# Patient Record
Sex: FEMALE | Race: OTHER | HISPANIC OR LATINO | URBAN - METROPOLITAN AREA
[De-identification: names, ages, dates, MRNs, and addresses within clinical notes are randomized per-mention and may not be internally consistent; named-entity substitution may affect disease eponyms.]

---

## 2020-06-01 ENCOUNTER — OFFICE VISIT (OUTPATIENT)
Dept: URBAN - METROPOLITAN AREA CLINIC 63 | Facility: CLINIC | Age: 50
End: 2020-06-01

## 2022-07-09 ENCOUNTER — TELEPHONE ENCOUNTER (OUTPATIENT)
Dept: URBAN - METROPOLITAN AREA CLINIC 121 | Facility: CLINIC | Age: 52
End: 2022-07-09

## 2022-07-09 RX ORDER — PANTOPRAZOLE 20 MG/1
TAKE 1 TABLET BY MOUTH EVERY DAY TABLET, DELAYED RELEASE ORAL
Refills: 0 | OUTPATIENT
Start: 2021-04-23 | End: 2021-05-02

## 2022-07-09 RX ORDER — FENOFIBRATE 145 MG/1
TABLET, FILM COATED ORAL
Refills: 3 | OUTPATIENT
Start: 2018-12-21 | End: 2019-08-14

## 2022-07-09 RX ORDER — METOCLOPRAMIDE HYDROCHLORIDE 5 MG/1
TABLET ORAL
Refills: 0 | OUTPATIENT
Start: 2019-08-14 | End: 2020-04-20

## 2022-07-09 RX ORDER — PANTOPRAZOLE SODIUM 40 MG/1
TABLET, DELAYED RELEASE ORAL
Refills: 3 | OUTPATIENT
Start: 2018-12-06 | End: 2019-08-14

## 2022-07-09 RX ORDER — PRUCALOPRIDE 2 MG/1
ONCE A DAY TABLET, FILM COATED ORAL ONCE A DAY
Refills: 3 | OUTPATIENT
Start: 2020-02-12 | End: 2020-05-21

## 2022-07-09 RX ORDER — IRBESARTAN 150 MG/1
TABLET ORAL
Refills: 0 | OUTPATIENT
Start: 2019-01-17 | End: 2019-08-14

## 2022-07-09 RX ORDER — GINKGO BILOBA LEAF EXTRACT 120 MG
CAPSULE ORAL
Refills: 0 | OUTPATIENT
Start: 2014-12-11 | End: 2015-04-01

## 2022-07-09 RX ORDER — PANTOPRAZOLE 20 MG/1
TABLET, DELAYED RELEASE ORAL TWICE A DAY
Refills: 3 | OUTPATIENT
Start: 2014-10-24 | End: 2014-11-19

## 2022-07-09 RX ORDER — METOPROLOL SUCCINATE 25 MG/1
TABLET, EXTENDED RELEASE ORAL
Refills: 0 | OUTPATIENT
Start: 2012-09-19 | End: 2014-09-08

## 2022-07-09 RX ORDER — TIZANIDINE HYDROCHLORIDE 4 MG/1
TABLET ORAL
Refills: 1 | OUTPATIENT
Start: 2018-12-21 | End: 2019-08-14

## 2022-07-09 RX ORDER — PREGABALIN 100 MG
CAPSULE ORAL TAKE AS DIRECTED
Refills: 0 | OUTPATIENT
Start: 2015-04-01 | End: 2019-01-17

## 2022-07-09 RX ORDER — PANTOPRAZOLE 20 MG/1
ONCE A DAY TABLET, DELAYED RELEASE ORAL ONCE A DAY
Refills: 3 | OUTPATIENT
Start: 2020-05-21 | End: 2021-04-23

## 2022-07-09 RX ORDER — METOPROLOL SUCCINATE 25 MG/1
TABLET, EXTENDED RELEASE ORAL
Refills: 0 | OUTPATIENT
Start: 2014-09-08 | End: 2019-01-17

## 2022-07-09 RX ORDER — FAMOTIDINE 20 MG
ONCE A DAY TABLET ORAL ONCE A DAY
Refills: 2 | OUTPATIENT
Start: 2015-04-01 | End: 2016-03-03

## 2022-07-09 RX ORDER — GABAPENTIN 300 MG/1
CAPSULE ORAL
Refills: 3 | OUTPATIENT
Start: 2018-12-21 | End: 2019-08-14

## 2022-07-09 RX ORDER — BUTALBITAL, ACETAMINOPHEN, AND CAFFEINE 50; 325; 40 MG/1; MG/1; MG/1
TABLET ORAL
Refills: 0 | OUTPATIENT
Start: 2018-12-21 | End: 2019-08-14

## 2022-07-09 RX ORDER — NAPROXEN 500 MG/1
TABLET ORAL
Refills: 0 | OUTPATIENT
Start: 2012-09-19 | End: 2012-09-19

## 2022-07-09 RX ORDER — IBUPROFEN 800 MG/1
TABLET, FILM COATED ORAL
Refills: 1 | OUTPATIENT
Start: 2018-12-21 | End: 2019-08-14

## 2022-07-09 RX ORDER — NAPROXEN 500 MG/1
TABLET ORAL
Refills: 0 | OUTPATIENT
Start: 2012-09-19 | End: 2013-03-04

## 2022-07-09 RX ORDER — METOPROLOL SUCCINATE 100 MG/1
TABLET, EXTENDED RELEASE ORAL
Refills: 3 | OUTPATIENT
Start: 2018-12-21 | End: 2019-08-14

## 2022-07-09 RX ORDER — FAMOTIDINE 20 MG
TABLET ORAL
Refills: 0 | OUTPATIENT
Start: 2014-09-08 | End: 2015-04-01

## 2022-07-09 RX ORDER — ONDANSETRON HYDROCHLORIDE 4 MG/1
TABLET, FILM COATED ORAL
Refills: 0 | OUTPATIENT
Start: 2019-01-17 | End: 2019-08-14

## 2022-07-09 RX ORDER — TIZANIDINE HYDROCHLORIDE 2 MG/1
CAPSULE ORAL
Refills: 0 | OUTPATIENT
Start: 2012-09-19 | End: 2013-03-04

## 2022-07-10 ENCOUNTER — TELEPHONE ENCOUNTER (OUTPATIENT)
Dept: URBAN - METROPOLITAN AREA CLINIC 121 | Facility: CLINIC | Age: 52
End: 2022-07-10

## 2022-07-10 RX ORDER — PRUCALOPRIDE 2 MG/1
ONCE A DAY TABLET, FILM COATED ORAL ONCE A DAY
Refills: 3 | Status: ACTIVE | COMMUNITY
Start: 2020-05-21

## 2022-07-10 RX ORDER — TIZANIDINE HYDROCHLORIDE 4 MG/1
TABLET ORAL
Refills: 1 | Status: ACTIVE | COMMUNITY
Start: 2019-08-14

## 2022-07-10 RX ORDER — PANTOPRAZOLE SODIUM 40 MG/1
TABLET, DELAYED RELEASE ORAL
Refills: 3 | Status: ACTIVE | COMMUNITY
Start: 2019-08-14

## 2022-07-10 RX ORDER — HYDROCORTISONE 2.5% 25 MG/G
CREAM TOPICAL THREE TIMES A DAY
Refills: 0 | Status: ACTIVE | COMMUNITY
Start: 2019-01-17

## 2022-07-10 RX ORDER — FAMOTIDINE 20 MG
ONCE A DAY TABLET ORAL ONCE A DAY
Refills: 2 | Status: ACTIVE | COMMUNITY
Start: 2016-03-03

## 2022-07-10 RX ORDER — DOCUSATE SODIUM 100 MG/1
CAPSULE ORAL TWICE A DAY
Refills: 3 | Status: ACTIVE | COMMUNITY
Start: 2012-09-19

## 2022-07-10 RX ORDER — PANTOPRAZOLE 20 MG/1
ONCE A DAY TABLET, DELAYED RELEASE ORAL ONCE A DAY
Refills: 1 | Status: ACTIVE | COMMUNITY
Start: 2021-05-02

## 2022-07-10 RX ORDER — SIMVASTATIN 20 MG/1
TABLET, FILM COATED ORAL ONCE A DAY
Refills: 3 | Status: ACTIVE | COMMUNITY
Start: 2014-12-11

## 2022-07-10 RX ORDER — ONDANSETRON HYDROCHLORIDE 4 MG/1
TABLET, FILM COATED ORAL
Refills: 0 | Status: ACTIVE | COMMUNITY
Start: 2019-08-14

## 2022-07-10 RX ORDER — IRBESARTAN 150 MG/1
TABLET ORAL
Refills: 0 | Status: ACTIVE | COMMUNITY
Start: 2019-08-14

## 2022-07-10 RX ORDER — LORATADINE 5 MG
17 G IN 8 OZ WATER PO Q DAILY PRN TABLET,CHEWABLE ORAL TAKE AS DIRECTED
Refills: 4 | Status: ACTIVE | COMMUNITY
Start: 2012-09-19

## 2022-07-10 RX ORDER — PANTOPRAZOLE 20 MG/1
TABLET, DELAYED RELEASE ORAL TWICE A DAY
Refills: 3 | Status: ACTIVE | COMMUNITY
Start: 2014-11-19

## 2022-07-10 RX ORDER — GABAPENTIN 300 MG/1
CAPSULE ORAL
Refills: 3 | Status: ACTIVE | COMMUNITY
Start: 2019-08-14

## 2022-07-10 RX ORDER — METOPROLOL SUCCINATE 100 MG/1
TABLET, EXTENDED RELEASE ORAL
Refills: 3 | Status: ACTIVE | COMMUNITY
Start: 2019-08-14

## 2022-07-10 RX ORDER — BUTALBITAL, ACETAMINOPHEN, AND CAFFEINE 50; 325; 40 MG/1; MG/1; MG/1
TABLET ORAL
Refills: 0 | Status: ACTIVE | COMMUNITY
Start: 2019-08-14

## 2022-07-10 RX ORDER — SACCHAROMYCES BOULARDII 250 MG
CAPSULE ORAL ONCE A DAY
Refills: 1 | Status: ACTIVE | COMMUNITY
Start: 2014-11-19

## 2022-07-10 RX ORDER — IBUPROFEN 800 MG/1
TABLET, FILM COATED ORAL
Refills: 1 | Status: ACTIVE | COMMUNITY
Start: 2019-08-14

## 2023-08-18 ENCOUNTER — TELEPHONE ENCOUNTER (OUTPATIENT)
Dept: URBAN - METROPOLITAN AREA CLINIC 64 | Facility: CLINIC | Age: 53
End: 2023-08-18

## 2023-08-21 ENCOUNTER — WEB ENCOUNTER (OUTPATIENT)
Dept: URBAN - METROPOLITAN AREA CLINIC 63 | Facility: CLINIC | Age: 53
End: 2023-08-21

## 2023-08-21 ENCOUNTER — TELEPHONE ENCOUNTER (OUTPATIENT)
Dept: URBAN - METROPOLITAN AREA CLINIC 63 | Facility: CLINIC | Age: 53
End: 2023-08-21

## 2023-08-21 ENCOUNTER — LAB OUTSIDE AN ENCOUNTER (OUTPATIENT)
Dept: URBAN - METROPOLITAN AREA CLINIC 63 | Facility: CLINIC | Age: 53
End: 2023-08-21

## 2023-08-21 ENCOUNTER — OFFICE VISIT (OUTPATIENT)
Dept: URBAN - METROPOLITAN AREA CLINIC 63 | Facility: CLINIC | Age: 53
End: 2023-08-21
Payer: MEDICARE

## 2023-08-21 VITALS
TEMPERATURE: 97.8 F | DIASTOLIC BLOOD PRESSURE: 98 MMHG | BODY MASS INDEX: 31.28 KG/M2 | HEART RATE: 118 BPM | HEIGHT: 62 IN | WEIGHT: 170 LBS | OXYGEN SATURATION: 97 % | SYSTOLIC BLOOD PRESSURE: 158 MMHG

## 2023-08-21 DIAGNOSIS — K76.0 FATTY (CHANGE OF) LIVER, NOT ELSEWHERE CLASSIFIED: ICD-10-CM

## 2023-08-21 DIAGNOSIS — R74.8 ABNORMAL LIVER ENZYMES: ICD-10-CM

## 2023-08-21 DIAGNOSIS — K31.84 GASTROPARESIS: ICD-10-CM

## 2023-08-21 DIAGNOSIS — K29.50 CHRONIC GASTRITIS WITHOUT BLEEDING, UNSPECIFIED GASTRITIS TYPE: ICD-10-CM

## 2023-08-21 DIAGNOSIS — K21.9 GASTROESOPHAGEAL REFLUX DISEASE WITHOUT ESOPHAGITIS: ICD-10-CM

## 2023-08-21 PROBLEM — 266435005: Status: ACTIVE | Noted: 2023-08-21

## 2023-08-21 PROBLEM — 8493009: Status: ACTIVE | Noted: 2023-08-21

## 2023-08-21 PROCEDURE — 76700 US EXAM ABDOM COMPLETE: CPT | Performed by: NURSE PRACTITIONER

## 2023-08-21 PROCEDURE — 76705 ECHO EXAM OF ABDOMEN: CPT | Performed by: NURSE PRACTITIONER

## 2023-08-21 PROCEDURE — 99203 OFFICE O/P NEW LOW 30 MIN: CPT | Performed by: NURSE PRACTITIONER

## 2023-08-21 PROCEDURE — 91200 LIVER ELASTOGRAPHY: CPT | Performed by: NURSE PRACTITIONER

## 2023-08-21 RX ORDER — SACCHAROMYCES BOULARDII 250 MG
CAPSULE ORAL ONCE A DAY
Refills: 1 | Status: ACTIVE | COMMUNITY
Start: 2014-11-19

## 2023-08-21 RX ORDER — PANTOPRAZOLE SODIUM 40 MG/1
1 TABLET TABLET, DELAYED RELEASE ORAL ONCE A DAY
Qty: 90 TABLET | Refills: 0 | OUTPATIENT
Start: 2023-08-21

## 2023-08-21 RX ORDER — METOPROLOL SUCCINATE 100 MG/1
TABLET, EXTENDED RELEASE ORAL
Refills: 3 | Status: ACTIVE | COMMUNITY
Start: 2019-08-14

## 2023-08-21 RX ORDER — METOCLOPRAMIDE 5 MG/1
1 TABLET BEFORE MEALS TABLET ORAL TWICE A DAY
Qty: 180 TABLET | OUTPATIENT
Start: 2023-08-21

## 2023-08-21 RX ORDER — IRBESARTAN 150 MG/1
TABLET ORAL
Refills: 0 | Status: ACTIVE | COMMUNITY
Start: 2019-08-14

## 2023-08-21 RX ORDER — PANTOPRAZOLE SODIUM 40 MG/1
TABLET, DELAYED RELEASE ORAL
Refills: 3 | Status: ACTIVE | COMMUNITY
Start: 2019-08-14

## 2023-08-21 RX ORDER — GABAPENTIN 300 MG/1
CAPSULE ORAL
Refills: 3 | Status: ACTIVE | COMMUNITY
Start: 2019-08-14

## 2023-08-21 RX ORDER — SIMVASTATIN 20 MG/1
TABLET, FILM COATED ORAL ONCE A DAY
Refills: 3 | Status: ACTIVE | COMMUNITY
Start: 2014-12-11

## 2023-08-21 RX ORDER — ONDANSETRON HYDROCHLORIDE 4 MG/1
TABLET, FILM COATED ORAL
Refills: 0 | Status: ACTIVE | COMMUNITY
Start: 2019-08-14

## 2023-08-21 NOTE — HPI-TODAY'S VISIT:
12/14: Patient still having high levels of  liver enzymes and also high level of cholesterol and triglycerides, but they are trending down. Had decreased in her weight 7 pounds (3.18 kg) in last 3 weeks. Abdomen US fatty liver. Patient states she is feeling better but is not taking her Reglan nor the Florastor, She agrees continue with her treatment, will fu her in 3 moths.  4/15:  Patient continue with weight loss 2 lbs since last visit, AST 74 increase from 65, ALT: is 94 from 100, normal bili 0.4 and AP is normal 69.   TG: from 156 to 112. And cholesterol is 198.  Will continue Zocor for now, will follow in 3 months. Will do labs and ultrasound.  1/19;  Patient comes with severe GERD ad lower abdominal pain. Patient comes for surveillance colonoscopy and irritation of hemorrhoids and episode of black Stool melena, does had episode of coffee ground  reflux 2 weeks ago. Will start Reglan for her gastroparesis, and will continue PPI, EGD and colonoscopy. Patient takes NSAID will avoid NSAID.  8/19: Patient had a colonoscopy on February 1, 2019, with the finding of a 5 mm polyp in the sigmoid colon that was removed with cold biopsy forceps, internal hemorrhoids otherwise normal colon. Pathology showed hyperplastic poly, will repeat colonoscopy in 5 years. EGD showed small hiatal hernia and gastritis negative for H Pylori. Distal esophagus with inflammation, negative for Intestinal metaplasia or for fungal microorganism. Patient persist with symptoms pf gastroparesis, resolved  with Reglan will restart treatment patient denies any side effect and will start Reglan. Patient LFT persist elevated but had decrease AST: 47 and ALT: 83 rest of the labs Bili, and AP is negative. Will follow in 6 months. Discussed the need for high fiber diet and adequate hydration.  Information sheet reviewed a copy provided.   2/20 Patient here today complaining of having  left lower quadrant abdominal pain. The pain is not accompanied by nausea, vomiting, diarrhea, or fever, she is also complaining of constipation, patient has done a colonoscopy a year ago that shows evidence of internal hemorrhoids and one small benign polyp with no evidence of diverticular disease. Plan: Patient will do clear liquid diet today, will call us if symptoms get worse or do not improve, or will go to ER Department.  Patient also will have CT abdomen and pelvis. Will start on laxatives, will increase fiber and fluid intake. Continue metoclopramide 5 mg 3 times a day.  No side effects reported. 4/20 Patient is complaining of having symptoms of gastritis and also gastroparesis with abdominal distention increasing reflux, increasing gases abdominal upper abdominal pain decrease in appetite.  Patient said she is taking metoclopramide just 1 time a day in a.m.  She is not doing diet properly, she is eating large portion meal late at night. Metoclopramide side effect were discussed with her again. Plan: Patient will do diet and treatment for gastritis and gastroparesis.  She will continue Reglan 5 mg 3 times a day and also will continue with PPI once a day.  Patient will call us if symptoms do not improve. 5/20 Patient seems to be in good general state today, she did increase metoclopramide 5 mg 3 times a day and symptoms of gastroparesis are better, Patient is complaining of having some twitching on her right eyelids.  She said she never experiences this kind of muscle movement before she started metoclopramide.Patient also complaining of constipation which is chronic patient has tried OTC laxative like lactulose, bisacodyl, milk of magnesia, MiraLAX did not work for him for her she said Motegrity is working good for her. Plan: Patient will continue with Motegrity 2 mg daily, and will alternate every other day if diarrhea.  She also will increase fiber and fluid intake and exercises. Patient will do diet for gastroparesis increasing frequency of her meals and decreasing the load of her meals. Patient was instructed to DC the use of metoclopramide due to involuntary muscle movement. Patient will do diet for gastritis and reflux and will have pantoprazole 20 mg once a day. 8/23 Patient here today in good general state.  She is complaining of symptom of dyspepsia, gastritis, reflux, and for a CT abdomen and pelvis done on May 2023 that shows evidence of fatty liver, she also claims to have elevated liver enzymes.  Unfortunately I do not have this laboratory report. Patient will have EGD, gastric emptying study, and liver work-up.  Diet for gastroparesis, continue with metoclopramide 5 mg 3 times a day.

## 2023-08-22 ENCOUNTER — OFFICE VISIT (OUTPATIENT)
Dept: URBAN - METROPOLITAN AREA CLINIC 60 | Facility: CLINIC | Age: 53
End: 2023-08-22

## 2023-08-28 ENCOUNTER — LAB OUTSIDE AN ENCOUNTER (OUTPATIENT)
Dept: URBAN - METROPOLITAN AREA CLINIC 63 | Facility: CLINIC | Age: 53
End: 2023-08-28

## 2023-09-14 ENCOUNTER — TELEPHONE ENCOUNTER (OUTPATIENT)
Dept: URBAN - METROPOLITAN AREA CLINIC 63 | Facility: CLINIC | Age: 53
End: 2023-09-14

## 2023-09-21 ENCOUNTER — LAB OUTSIDE AN ENCOUNTER (OUTPATIENT)
Dept: URBAN - METROPOLITAN AREA CLINIC 63 | Facility: CLINIC | Age: 53
End: 2023-09-21

## 2023-09-25 ENCOUNTER — OFFICE VISIT (OUTPATIENT)
Dept: URBAN - METROPOLITAN AREA CLINIC 63 | Facility: CLINIC | Age: 53
End: 2023-09-25

## 2023-09-29 ENCOUNTER — TELEPHONE ENCOUNTER (OUTPATIENT)
Dept: URBAN - METROPOLITAN AREA CLINIC 63 | Facility: CLINIC | Age: 53
End: 2023-09-29

## 2023-10-05 ENCOUNTER — OFFICE VISIT (OUTPATIENT)
Dept: URBAN - METROPOLITAN AREA SURGERY CENTER 4 | Facility: SURGERY CENTER | Age: 53
End: 2023-10-05

## 2023-10-13 ENCOUNTER — TELEPHONE ENCOUNTER (OUTPATIENT)
Dept: URBAN - METROPOLITAN AREA CLINIC 63 | Facility: CLINIC | Age: 53
End: 2023-10-13

## 2023-10-13 RX ORDER — PANTOPRAZOLE SODIUM 40 MG/1
1 TABLET TABLET, DELAYED RELEASE ORAL ONCE A DAY
Qty: 90 TABLET | Refills: 0
Start: 2023-08-21

## 2023-10-20 ENCOUNTER — TELEPHONE ENCOUNTER (OUTPATIENT)
Dept: URBAN - METROPOLITAN AREA CLINIC 63 | Facility: CLINIC | Age: 53
End: 2023-10-20

## 2023-10-20 RX ORDER — PANTOPRAZOLE SODIUM 40 MG/1
1 TABLET TABLET, DELAYED RELEASE ORAL ONCE A DAY
Qty: 90 TABLET | Refills: 0
Start: 2023-08-21

## 2023-10-24 ENCOUNTER — OFFICE VISIT (OUTPATIENT)
Dept: URBAN - METROPOLITAN AREA CLINIC 60 | Facility: CLINIC | Age: 53
End: 2023-10-24

## 2023-11-10 ENCOUNTER — CLAIMS CREATED FROM THE CLAIM WINDOW (OUTPATIENT)
Dept: URBAN - METROPOLITAN AREA SURGERY CENTER 4 | Facility: SURGERY CENTER | Age: 53
End: 2023-11-10
Payer: MEDICARE

## 2023-11-10 DIAGNOSIS — Z12.11 ENCOUNTER FOR SCREENING FOR MALIGNANT NEOPLASM OF COLON: ICD-10-CM

## 2023-11-10 DIAGNOSIS — Z87.19 PERSONAL HISTORY OF OTHER DISEASES OF THE DIGESTIVE SYSTEM: ICD-10-CM

## 2023-11-10 DIAGNOSIS — K29.50 CHRONIC GASTRITIS WITHOUT BLEEDING, UNSPECIFIED GASTRITIS TYPE: ICD-10-CM

## 2023-11-10 DIAGNOSIS — Z86.010 ADENOMAS PERSONAL HISTORY OF COLONIC POLYPS: ICD-10-CM

## 2023-11-10 DIAGNOSIS — K63.5 BENIGN COLON POLYPS: ICD-10-CM

## 2023-11-10 DIAGNOSIS — K44.9 DIAPHRAGMATIC HERNIA WITHOUT OBSTRUCTION OR GANGRENE: ICD-10-CM

## 2023-11-10 DIAGNOSIS — K64.1 SECOND DEGREE HEMORRHOIDS: ICD-10-CM

## 2023-11-10 DIAGNOSIS — K63.5 POLYP OF SIGMOID COLON, UNSPECIFIED TYPE: ICD-10-CM

## 2023-11-10 DIAGNOSIS — K44.9 HIATAL HERNIA: ICD-10-CM

## 2023-11-10 DIAGNOSIS — K21.9 ESOPHAGEAL REFLUX: ICD-10-CM

## 2023-11-10 PROCEDURE — 43239 EGD BIOPSY SINGLE/MULTIPLE: CPT | Performed by: INTERNAL MEDICINE

## 2023-11-10 PROCEDURE — 45380 COLONOSCOPY AND BIOPSY: CPT | Performed by: INTERNAL MEDICINE

## 2023-11-10 PROCEDURE — 00813 ANES UPR LWR GI NDSC PX: CPT | Performed by: NURSE ANESTHETIST, CERTIFIED REGISTERED

## 2023-11-10 RX ORDER — METOCLOPRAMIDE 5 MG/1
1 TABLET BEFORE MEALS TABLET ORAL TWICE A DAY
Qty: 180 TABLET | Status: ACTIVE | COMMUNITY
Start: 2023-08-21

## 2023-11-10 RX ORDER — GABAPENTIN 300 MG/1
CAPSULE ORAL
Refills: 3 | Status: ACTIVE | COMMUNITY
Start: 2019-08-14

## 2023-11-10 RX ORDER — METOPROLOL SUCCINATE 100 MG/1
TABLET, EXTENDED RELEASE ORAL
Refills: 3 | Status: ACTIVE | COMMUNITY
Start: 2019-08-14

## 2023-11-10 RX ORDER — PANTOPRAZOLE SODIUM 40 MG/1
TABLET, DELAYED RELEASE ORAL
Refills: 3 | Status: ACTIVE | COMMUNITY
Start: 2019-08-14

## 2023-11-10 RX ORDER — IRBESARTAN 150 MG/1
TABLET ORAL
Refills: 0 | Status: ACTIVE | COMMUNITY
Start: 2019-08-14

## 2023-11-10 RX ORDER — ONDANSETRON HYDROCHLORIDE 4 MG/1
TABLET, FILM COATED ORAL
Refills: 0 | Status: ACTIVE | COMMUNITY
Start: 2019-08-14

## 2023-11-10 RX ORDER — SACCHAROMYCES BOULARDII 250 MG
CAPSULE ORAL ONCE A DAY
Refills: 1 | Status: ACTIVE | COMMUNITY
Start: 2014-11-19

## 2023-11-10 RX ORDER — SIMVASTATIN 20 MG/1
TABLET, FILM COATED ORAL ONCE A DAY
Refills: 3 | Status: ACTIVE | COMMUNITY
Start: 2014-12-11

## 2023-11-10 RX ORDER — PANTOPRAZOLE SODIUM 40 MG/1
1 TABLET TABLET, DELAYED RELEASE ORAL ONCE A DAY
Qty: 90 TABLET | Refills: 0 | Status: ACTIVE | COMMUNITY
Start: 2023-08-21

## 2023-11-21 ENCOUNTER — OFFICE VISIT (OUTPATIENT)
Dept: URBAN - METROPOLITAN AREA CLINIC 60 | Facility: CLINIC | Age: 53
End: 2023-11-21

## 2023-11-21 RX ORDER — SIMVASTATIN 20 MG/1
TABLET, FILM COATED ORAL ONCE A DAY
Refills: 3 | Status: ACTIVE | COMMUNITY
Start: 2014-12-11

## 2023-11-21 RX ORDER — GABAPENTIN 300 MG/1
CAPSULE ORAL
Refills: 3 | Status: ACTIVE | COMMUNITY
Start: 2019-08-14

## 2023-11-21 RX ORDER — METOPROLOL SUCCINATE 100 MG/1
TABLET, EXTENDED RELEASE ORAL
Refills: 3 | Status: ACTIVE | COMMUNITY
Start: 2019-08-14

## 2023-11-21 RX ORDER — PANTOPRAZOLE SODIUM 40 MG/1
1 TABLET TABLET, DELAYED RELEASE ORAL ONCE A DAY
Qty: 90 TABLET | Refills: 0 | Status: ACTIVE | COMMUNITY
Start: 2023-08-21

## 2023-11-21 RX ORDER — IRBESARTAN 150 MG/1
TABLET ORAL
Refills: 0 | Status: ACTIVE | COMMUNITY
Start: 2019-08-14

## 2023-11-21 RX ORDER — METOCLOPRAMIDE 5 MG/1
1 TABLET BEFORE MEALS TABLET ORAL TWICE A DAY
Qty: 180 TABLET | Status: ACTIVE | COMMUNITY
Start: 2023-08-21

## 2023-11-21 RX ORDER — PANTOPRAZOLE SODIUM 40 MG/1
TABLET, DELAYED RELEASE ORAL
Refills: 3 | Status: ACTIVE | COMMUNITY
Start: 2019-08-14

## 2023-11-21 RX ORDER — ONDANSETRON HYDROCHLORIDE 4 MG/1
TABLET, FILM COATED ORAL
Refills: 0 | Status: ACTIVE | COMMUNITY
Start: 2019-08-14

## 2023-11-21 RX ORDER — SACCHAROMYCES BOULARDII 250 MG
CAPSULE ORAL ONCE A DAY
Refills: 1 | Status: ACTIVE | COMMUNITY
Start: 2014-11-19

## 2023-12-29 ENCOUNTER — OFFICE VISIT (OUTPATIENT)
Dept: URBAN - METROPOLITAN AREA CLINIC 60 | Facility: CLINIC | Age: 53
End: 2023-12-29
Payer: MEDICARE

## 2023-12-29 ENCOUNTER — DASHBOARD ENCOUNTERS (OUTPATIENT)
Age: 53
End: 2023-12-29

## 2023-12-29 VITALS
TEMPERATURE: 97.6 F | BODY MASS INDEX: 32.57 KG/M2 | HEART RATE: 87 BPM | SYSTOLIC BLOOD PRESSURE: 144 MMHG | WEIGHT: 177 LBS | DIASTOLIC BLOOD PRESSURE: 82 MMHG | RESPIRATION RATE: 20 BRPM | OXYGEN SATURATION: 98 % | HEIGHT: 62 IN

## 2023-12-29 DIAGNOSIS — K44.9 HIATAL HERNIA: ICD-10-CM

## 2023-12-29 DIAGNOSIS — K21.00 GASTROESOPHAGEAL REFLUX DISEASE WITH ESOPHAGITIS WITHOUT HEMORRHAGE: ICD-10-CM

## 2023-12-29 DIAGNOSIS — K29.50 OTHER CHRONIC GASTRITIS WITHOUT HEMORRHAGE: ICD-10-CM

## 2023-12-29 DIAGNOSIS — K31.84 GASTROPARESIS: ICD-10-CM

## 2023-12-29 PROBLEM — 8493009: Status: ACTIVE | Noted: 2023-12-29

## 2023-12-29 PROBLEM — 266433003: Status: ACTIVE | Noted: 2023-12-29

## 2023-12-29 PROCEDURE — 99214 OFFICE O/P EST MOD 30 MIN: CPT | Performed by: NURSE PRACTITIONER

## 2023-12-29 RX ORDER — IRBESARTAN 150 MG/1
TABLET ORAL
Refills: 0 | Status: ACTIVE | COMMUNITY
Start: 2019-08-14

## 2023-12-29 RX ORDER — PANTOPRAZOLE SODIUM 20 MG/1
1 TABLET TABLET, DELAYED RELEASE ORAL ONCE A DAY
Qty: 90 TABLET | Refills: 0 | OUTPATIENT
Start: 2023-12-29

## 2023-12-29 RX ORDER — ONDANSETRON HYDROCHLORIDE 4 MG/1
TABLET, FILM COATED ORAL
Refills: 0 | Status: ACTIVE | COMMUNITY
Start: 2019-08-14

## 2023-12-29 RX ORDER — METOCLOPRAMIDE 5 MG/1
1 TABLET BEFORE MEALS TABLET ORAL TWICE A DAY
Qty: 180 TABLET | Status: ACTIVE | COMMUNITY
Start: 2023-08-21

## 2023-12-29 RX ORDER — GABAPENTIN 300 MG/1
CAPSULE ORAL
Refills: 3 | Status: ACTIVE | COMMUNITY
Start: 2019-08-14

## 2023-12-29 RX ORDER — PANTOPRAZOLE SODIUM 40 MG/1
1 TABLET TABLET, DELAYED RELEASE ORAL ONCE A DAY
Qty: 90 TABLET | Refills: 0 | Status: ACTIVE | COMMUNITY
Start: 2023-08-21

## 2023-12-29 RX ORDER — PANTOPRAZOLE SODIUM 40 MG/1
TABLET, DELAYED RELEASE ORAL
Refills: 3 | Status: ACTIVE | COMMUNITY
Start: 2019-08-14

## 2023-12-29 RX ORDER — SIMVASTATIN 20 MG/1
TABLET, FILM COATED ORAL ONCE A DAY
Refills: 3 | Status: ACTIVE | COMMUNITY
Start: 2014-12-11

## 2023-12-29 RX ORDER — SACCHAROMYCES BOULARDII 250 MG
CAPSULE ORAL ONCE A DAY
Refills: 1 | Status: ACTIVE | COMMUNITY
Start: 2014-11-19

## 2023-12-29 RX ORDER — METOCLOPRAMIDE 5 MG/1
1 TABLET BEFORE MEALS TABLET ORAL TWICE A DAY
Qty: 180 TABLET | Refills: 1 | OUTPATIENT
Start: 2023-12-29

## 2023-12-29 RX ORDER — METOPROLOL SUCCINATE 100 MG/1
TABLET, EXTENDED RELEASE ORAL
Refills: 3 | Status: ACTIVE | COMMUNITY
Start: 2019-08-14

## 2023-12-29 NOTE — HPI-TODAY'S VISIT:
12/14: Patient still having high levels of  liver enzymes and also high level of cholesterol and triglycerides, but they are trending down. Had decreased in her weight 7 pounds (3.18 kg) in last 3 weeks. Abdomen US fatty liver. Patient states she is feeling better but is not taking her Reglan nor the Florastor, She agrees continue with her treatment, will fu her in 3 moths.  4/15:  Patient continue with weight loss 2 lbs since last visit, AST 74 increase from 65, ALT: is 94 from 100, normal bili 0.4 and AP is normal 69.   TG: from 156 to 112. And cholesterol is 198.  Will continue Zocor for now, will follow in 3 months. Will do labs and ultrasound.  1/19;  Patient comes with severe GERD ad lower abdominal pain. Patient comes for surveillance colonoscopy and irritation of hemorrhoids and episode of black Stool melena, does had episode of coffee ground  reflux 2 weeks ago. Will start Reglan for her gastroparesis, and will continue PPI, EGD and colonoscopy. Patient takes NSAID will avoid NSAID.  8/19: Patient had a colonoscopy on February 1, 2019, with the finding of a 5 mm polyp in the sigmoid colon that was removed with cold biopsy forceps, internal hemorrhoids otherwise normal colon. Pathology showed hyperplastic poly, will repeat colonoscopy in 5 years. EGD showed small hiatal hernia and gastritis negative for H Pylori. Distal esophagus with inflammation, negative for Intestinal metaplasia or for fungal microorganism. Patient persist with symptoms pf gastroparesis, resolved  with Reglan will restart treatment patient denies any side effect and will start Reglan. Patient LFT persist elevated but had decrease AST: 47 and ALT: 83 rest of the labs Bili, and AP is negative. Will follow in 6 months. Discussed the need for high fiber diet and adequate hydration.  Information sheet reviewed a copy provided.   2/20 Patient here today complaining of having  left lower quadrant abdominal pain. The pain is not accompanied by nausea, vomiting, diarrhea, or fever, she is also complaining of constipation, patient has done a colonoscopy a year ago that shows evidence of internal hemorrhoids and one small benign polyp with no evidence of diverticular disease. Plan: Patient will do clear liquid diet today, will call us if symptoms get worse or do not improve, or will go to ER Department.  Patient also will have CT abdomen and pelvis. Will start on laxatives, will increase fiber and fluid intake. Continue metoclopramide 5 mg 3 times a day.  No side effects reported. 4/20 Patient is complaining of having symptoms of gastritis and also gastroparesis with abdominal distention increasing reflux, increasing gases abdominal upper abdominal pain decrease in appetite.  Patient said she is taking metoclopramide just 1 time a day in a.m.  She is not doing diet properly, she is eating large portion meal late at night. Metoclopramide side effect were discussed with her again. Plan: Patient will do diet and treatment for gastritis and gastroparesis.  She will continue Reglan 5 mg 3 times a day and also will continue with PPI once a day.  Patient will call us if symptoms do not improve. 5/20 Patient seems to be in good general state today, she did increase metoclopramide 5 mg 3 times a day and symptoms of gastroparesis are better, Patient is complaining of having some twitching on her right eyelids.  She said she never experiences this kind of muscle movement before she started metoclopramide.Patient also complaining of constipation which is chronic patient has tried OTC laxative like lactulose, bisacodyl, milk of magnesia, MiraLAX did not work for him for her she said Motegrity is working good for her. Plan: Patient will continue with Motegrity 2 mg daily, and will alternate every other day if diarrhea.  She also will increase fiber and fluid intake and exercises. Patient will do diet for gastroparesis increasing frequency of her meals and decreasing the load of her meals. Patient was instructed to DC the use of metoclopramide due to involuntary muscle movement. Patient will do diet for gastritis and reflux and will have pantoprazole 20 mg once a day. 8/23 Patient here today in good general state.  She is complaining of symptom of dyspepsia, gastritis, reflux, and for a CT abdomen and pelvis done on May 2023 that shows evidence of fatty liver, she also claims to have elevated liver enzymes.  Unfortunately I do not have this laboratory report. Patient will have EGD, gastric emptying study, and liver work-up.  Diet for gastroparesis, continue with metoclopramide 5 mg 3 times a day. 12/23 EGD showed  Patient is evidence of small hiatal hernia, chronic gastritis, normal esophagus and examined duodenum.  Colonoscopy demonstrating three 5 mm hyperplastic polyp at the rectosigmoid colon, and internal hemorrhoids.  Bowel preparation was suboptimal next colonoscopy will be 1 year.Biopsy results duodenal mucosa without histopathologic findings.  Stomach, body/antrum biopsy with focal minimal chronic gastritis, negative for H. pylori organisms.After esophageal mucosa with mild reflux type changes, no intestinal metaplasia, or dysplasia.

## 2024-01-05 ENCOUNTER — TELEPHONE ENCOUNTER (OUTPATIENT)
Dept: URBAN - METROPOLITAN AREA CLINIC 64 | Facility: CLINIC | Age: 54
End: 2024-01-05

## 2024-05-30 ENCOUNTER — TELEPHONE ENCOUNTER (OUTPATIENT)
Dept: URBAN - METROPOLITAN AREA CLINIC 60 | Facility: CLINIC | Age: 54
End: 2024-05-30

## 2024-06-03 ENCOUNTER — OFFICE VISIT (OUTPATIENT)
Dept: URBAN - METROPOLITAN AREA CLINIC 60 | Facility: CLINIC | Age: 54
End: 2024-06-03

## 2024-06-03 RX ORDER — IRBESARTAN 150 MG/1
TABLET ORAL
Refills: 0 | Status: ACTIVE | COMMUNITY
Start: 2019-08-14

## 2024-06-03 RX ORDER — METOPROLOL SUCCINATE 100 MG/1
TABLET, EXTENDED RELEASE ORAL
Refills: 3 | Status: ACTIVE | COMMUNITY
Start: 2019-08-14

## 2024-06-03 RX ORDER — ONDANSETRON HYDROCHLORIDE 4 MG/1
TABLET, FILM COATED ORAL
Refills: 0 | Status: ACTIVE | COMMUNITY
Start: 2019-08-14

## 2024-06-03 RX ORDER — PANTOPRAZOLE SODIUM 40 MG/1
1 TABLET TABLET, DELAYED RELEASE ORAL ONCE A DAY
Qty: 90 TABLET | Refills: 0 | Status: ACTIVE | COMMUNITY
Start: 2023-08-21

## 2024-06-03 RX ORDER — PANTOPRAZOLE SODIUM 20 MG/1
1 TABLET TABLET, DELAYED RELEASE ORAL ONCE A DAY
Qty: 90 TABLET | Refills: 0 | Status: ACTIVE | COMMUNITY
Start: 2023-12-29

## 2024-06-03 RX ORDER — SACCHAROMYCES BOULARDII 250 MG
CAPSULE ORAL ONCE A DAY
Refills: 1 | Status: ACTIVE | COMMUNITY
Start: 2014-11-19

## 2024-06-03 RX ORDER — PANTOPRAZOLE SODIUM 40 MG/1
TABLET, DELAYED RELEASE ORAL
Refills: 3 | Status: ACTIVE | COMMUNITY
Start: 2019-08-14

## 2024-06-03 RX ORDER — GABAPENTIN 300 MG/1
CAPSULE ORAL
Refills: 3 | Status: ACTIVE | COMMUNITY
Start: 2019-08-14

## 2024-06-03 RX ORDER — METOCLOPRAMIDE 5 MG/1
1 TABLET BEFORE MEALS TABLET ORAL TWICE A DAY
Qty: 180 TABLET | Refills: 1 | Status: ACTIVE | COMMUNITY
Start: 2023-12-29

## 2024-06-03 RX ORDER — METOCLOPRAMIDE 5 MG/1
1 TABLET BEFORE MEALS TABLET ORAL TWICE A DAY
Qty: 180 TABLET | Status: ACTIVE | COMMUNITY
Start: 2023-08-21

## 2024-06-03 RX ORDER — SIMVASTATIN 20 MG/1
TABLET, FILM COATED ORAL ONCE A DAY
Refills: 3 | Status: ACTIVE | COMMUNITY
Start: 2014-12-11

## 2024-06-24 ENCOUNTER — OFFICE VISIT (OUTPATIENT)
Dept: URBAN - METROPOLITAN AREA CLINIC 63 | Facility: CLINIC | Age: 54
End: 2024-06-24

## 2024-07-08 ENCOUNTER — OFFICE VISIT (OUTPATIENT)
Dept: URBAN - METROPOLITAN AREA CLINIC 63 | Facility: CLINIC | Age: 54
End: 2024-07-08
Payer: MEDICARE

## 2024-07-08 VITALS
WEIGHT: 175 LBS | BODY MASS INDEX: 32.2 KG/M2 | HEIGHT: 62 IN | HEART RATE: 73 BPM | OXYGEN SATURATION: 98 % | TEMPERATURE: 98.3 F

## 2024-07-08 DIAGNOSIS — K29.50 OTHER CHRONIC GASTRITIS WITHOUT HEMORRHAGE: ICD-10-CM

## 2024-07-08 DIAGNOSIS — K44.9 HIATAL HERNIA: ICD-10-CM

## 2024-07-08 DIAGNOSIS — K76.0 FATTY LIVER: ICD-10-CM

## 2024-07-08 DIAGNOSIS — K21.00 GASTROESOPHAGEAL REFLUX DISEASE WITH ESOPHAGITIS WITHOUT HEMORRHAGE: ICD-10-CM

## 2024-07-08 PROCEDURE — 99214 OFFICE O/P EST MOD 30 MIN: CPT | Performed by: NURSE PRACTITIONER

## 2024-07-08 RX ORDER — FENOFIBRATE 145 MG/1
TABLET, COATED ORAL
Qty: 90 TABLET | Status: ACTIVE | COMMUNITY

## 2024-07-08 RX ORDER — PANTOPRAZOLE SODIUM 40 MG/1
1 TABLET TABLET, DELAYED RELEASE ORAL ONCE A DAY
Qty: 90 TABLET | Refills: 0 | Status: ACTIVE | COMMUNITY
Start: 2023-08-21

## 2024-07-08 RX ORDER — PANTOPRAZOLE SODIUM 20 MG/1
1 TABLET TABLET, DELAYED RELEASE ORAL ONCE A DAY
Qty: 90 TABLET | Refills: 0 | OUTPATIENT

## 2024-07-08 RX ORDER — SIMVASTATIN 20 MG/1
TABLET, FILM COATED ORAL ONCE A DAY
Refills: 3 | Status: ACTIVE | COMMUNITY
Start: 2014-12-11

## 2024-07-08 RX ORDER — ONDANSETRON HYDROCHLORIDE 4 MG/1
TABLET, FILM COATED ORAL
Refills: 0 | Status: ACTIVE | COMMUNITY
Start: 2019-08-14

## 2024-07-08 RX ORDER — METOPROLOL SUCCINATE 100 MG/1
TABLET, EXTENDED RELEASE ORAL
Refills: 3 | Status: ACTIVE | COMMUNITY
Start: 2019-08-14

## 2024-07-08 RX ORDER — METOCLOPRAMIDE 5 MG/1
1 TABLET BEFORE MEALS TABLET ORAL TWICE A DAY
Qty: 180 TABLET | Refills: 1 | OUTPATIENT

## 2024-07-08 RX ORDER — PANTOPRAZOLE SODIUM 40 MG/1
TABLET, DELAYED RELEASE ORAL
Refills: 3 | Status: ACTIVE | COMMUNITY
Start: 2019-08-14

## 2024-07-08 RX ORDER — GABAPENTIN 300 MG/1
CAPSULE ORAL
Refills: 3 | Status: ACTIVE | COMMUNITY
Start: 2019-08-14

## 2024-07-08 RX ORDER — BACLOFEN 10 MG/1
TABLET ORAL
Qty: 90 TABLET | Status: ACTIVE | COMMUNITY

## 2024-07-08 RX ORDER — IRBESARTAN 150 MG/1
TABLET ORAL
Refills: 0 | Status: ACTIVE | COMMUNITY
Start: 2019-08-14

## 2024-07-08 RX ORDER — METOCLOPRAMIDE 5 MG/1
1 TABLET BEFORE MEALS TABLET ORAL TWICE A DAY
Qty: 180 TABLET | Status: ACTIVE | COMMUNITY
Start: 2023-08-21

## 2024-07-08 NOTE — HPI-TODAY'S VISIT:
12/14: Patient still having high levels of  liver enzymes and also high level of cholesterol and triglycerides, but they are trending down. Had decreased in her weight 7 pounds (3.18 kg) in last 3 weeks. Abdomen US fatty liver. Patient states she is feeling better but is not taking her Reglan nor the Florastor, She agrees continue with her treatment, will fu her in 3 months.  4/15:  Patient continue with weight loss 2 lbs since last visit, AST 74 increase from 65, ALT: is 94 from 100, normal bili 0.4 and AP is normal 69.   TG: from 156 to 112. And cholesterol is 198.  Will continue Zocor for now, will follow in 3 months. Will do labs and ultrasound.  1/19;  Patient comes with severe GERD ad lower abdominal pain. Patient comes for surveillance colonoscopy and irritation of hemorrhoids and episode of black Stool melena, does have episode of coffee ground  reflux 2 weeks ago. Will start Reglan for her gastroparesis, and will continue PPI, EGD and colonoscopy. Patient takes NSAID will avoid NSAID.  8/19: Patient had a colonoscopy on February 1, 2019, with the finding of a 5 mm polyp in the sigmoid colon that was removed with cold biopsy forceps, internal hemorrhoids otherwise normal colon. Pathology showed hyperplastic poly, will repeat colonoscopy in 5 years. EGD showed small hiatal hernia and gastritis negative for H Pylori. Distal esophagus with inflammation, negative for Intestinal metaplasia or for fungal microorganism. Patient persist with symptoms pf gastroparesis, resolved  with Reglan will restart treatment patient denies any side effect and will start Reglan. Patient LFT persist elevated but had decrease AST: 47 and ALT: 83 rest of the labs Bili, and AP is negative. Will follow in 6 months. Discussed the need for high fiber diet and adequate hydration.  Information sheet reviewed a copy provided.   2/20 Patient here today complaining of having  left lower quadrant abdominal pain. The pain is not accompanied by nausea, vomiting, diarrhea, or fever, she is also complaining of constipation, patient has done a colonoscopy a year ago that shows evidence of internal hemorrhoids and one small benign polyp with no evidence of diverticular disease. Plan: Patient will do clear liquid diet today, will call us if symptoms get worse or do not improve, or will go to ER Department.  Patient also will have CT abdomen and pelvis. Will start on laxatives, will increase fiber and fluid intake. Continue metoclopramide 5 mg 3 times a day.  No side effects reported. 4/20 Patient is complaining of having symptoms of gastritis and also gastroparesis with abdominal distention increasing reflux, increasing gases abdominal upper abdominal pain decrease in appetite.  Patient said she is taking metoclopramide just 1 time a day in a.m.  She is not doing diet properly, she is eating large portion meal late at night. Metoclopramide side effect were discussed with her again. Plan: Patient will do diet and treatment for gastritis and gastroparesis.  She will continue Reglan 5 mg 3 times a day and also will continue with PPI once a day.  Patient will call us if symptoms do not improve. 5/20 Patient seems to be in good general state today, she did increase metoclopramide 5 mg 3 times a day and symptoms of gastroparesis are better, Patient is complaining of having some twitching on her right eyelids.  She said she never experiences this kind of muscle movement before she started metoclopramide.Patient also complaining of constipation which is chronic patient has tried OTC laxative like lactulose, bisacodyl, milk of magnesia, MiraLAX did not work for him for her, she said Motegrity is working good for her. Plan: Patient will continue with Motegrity 2 mg daily, and will alternate every other day if diarrhea.  She also will increase fiber and fluid intake and exercises. Patient will do diet for gastroparesis increasing frequency of her meals and decreasing the load of her meals. Patient was instructed to DC the use of metoclopramide due to involuntary muscle movement. Patient will do diet for gastritis and reflux and will have pantoprazole 20 mg once a day. 8/23 Patient here today in good general state.  She is complaining of symptom of dyspepsia, gastritis, reflux, and for a CT abdomen and pelvis done on May 2023 that shows evidence of fatty liver, she also claims to have elevated liver enzymes.  Unfortunately I do not have this laboratory report. Patient will have EGD, gastric emptying study, and liver work-up.  Diet for gastroparesis, continue with metoclopramide 5 mg 3 times a day. 12/23 EGD showed  Patient is evidence of small hiatal hernia, chronic gastritis, normal esophagus and examined duodenum.  Colonoscopy demonstrating three 5 mm hyperplastic polyp at the rectosigmoid colon, and internal hemorrhoids.  Bowel preparation was suboptimal next colonoscopy will be 1 year.Biopsy results duodenal mucosa without histopathologic findings.  Stomach, body/antrum biopsy with focal minimal chronic gastritis, negative for H. pylori organisms.After esophageal mucosa with mild reflux type changes, no intestinal metaplasia, or dysplasia. 7/24 Patient is here today in good general state.  She is here for her fatty liver follow-up.  Patient also complaining of constipation.  Patient has medical history of gastroparesis slow transit constipation she is not taking her metoclopramide at this time.  Patient also medical history of chronic gastritis on chronic use of PPI. Patient will do diet for gastritis and reflux.  Resume treatment with metoclopramide. Colonoscopy in 4 months. Liver workup.

## 2024-07-15 ENCOUNTER — LAB OUTSIDE AN ENCOUNTER (OUTPATIENT)
Dept: URBAN - METROPOLITAN AREA CLINIC 63 | Facility: CLINIC | Age: 54
End: 2024-07-15

## 2024-07-31 ENCOUNTER — TELEPHONE ENCOUNTER (OUTPATIENT)
Dept: URBAN - METROPOLITAN AREA CLINIC 63 | Facility: CLINIC | Age: 54
End: 2024-07-31

## 2024-11-11 ENCOUNTER — LAB OUTSIDE AN ENCOUNTER (OUTPATIENT)
Dept: URBAN - METROPOLITAN AREA CLINIC 63 | Facility: CLINIC | Age: 54
End: 2024-11-11

## 2024-11-11 ENCOUNTER — OFFICE VISIT (OUTPATIENT)
Dept: URBAN - METROPOLITAN AREA CLINIC 63 | Facility: CLINIC | Age: 54
End: 2024-11-11
Payer: MEDICARE

## 2024-11-11 VITALS
BODY MASS INDEX: 31.47 KG/M2 | DIASTOLIC BLOOD PRESSURE: 78 MMHG | OXYGEN SATURATION: 98 % | HEART RATE: 76 BPM | WEIGHT: 171 LBS | HEIGHT: 62 IN | TEMPERATURE: 98.7 F | SYSTOLIC BLOOD PRESSURE: 156 MMHG

## 2024-11-11 DIAGNOSIS — Z86.0100 HISTORY OF COLON POLYPS: ICD-10-CM

## 2024-11-11 DIAGNOSIS — K44.9 HIATAL HERNIA: ICD-10-CM

## 2024-11-11 DIAGNOSIS — K76.0 FATTY LIVER: ICD-10-CM

## 2024-11-11 DIAGNOSIS — K29.50 OTHER CHRONIC GASTRITIS WITHOUT HEMORRHAGE: ICD-10-CM

## 2024-11-11 PROBLEM — 62484002: Status: ACTIVE | Noted: 2024-11-11

## 2024-11-11 PROCEDURE — 99214 OFFICE O/P EST MOD 30 MIN: CPT | Performed by: NURSE PRACTITIONER

## 2024-11-11 RX ORDER — GABAPENTIN 300 MG/1
CAPSULE ORAL
Refills: 3 | Status: ACTIVE | COMMUNITY
Start: 2019-08-14

## 2024-11-11 RX ORDER — BACLOFEN 10 MG/1
TABLET ORAL
Qty: 90 TABLET | Status: ACTIVE | COMMUNITY

## 2024-11-11 RX ORDER — PANTOPRAZOLE SODIUM 40 MG/1
1 TABLET TABLET, DELAYED RELEASE ORAL ONCE A DAY
Qty: 90 TABLET | Refills: 0 | Status: ACTIVE | COMMUNITY
Start: 2023-08-21

## 2024-11-11 RX ORDER — PANTOPRAZOLE SODIUM 20 MG/1
1 TABLET TABLET, DELAYED RELEASE ORAL ONCE A DAY
Qty: 90 TABLET | Refills: 0 | OUTPATIENT

## 2024-11-11 RX ORDER — PANTOPRAZOLE SODIUM 20 MG/1
1 TABLET TABLET, DELAYED RELEASE ORAL ONCE A DAY
Qty: 90 TABLET | Refills: 0 | Status: ACTIVE | COMMUNITY

## 2024-11-11 RX ORDER — METOPROLOL SUCCINATE 100 MG/1
TABLET, EXTENDED RELEASE ORAL
Refills: 3 | Status: ACTIVE | COMMUNITY
Start: 2019-08-14

## 2024-11-11 RX ORDER — RESMETIROM 80 MG/1
AS DIRECTED TABLET, COATED ORAL
Qty: 90 TABLETS | Refills: 0 | OUTPATIENT
Start: 2024-11-11

## 2024-11-11 RX ORDER — METOCLOPRAMIDE 5 MG/1
1 TABLET BEFORE MEALS TABLET ORAL TWICE A DAY
Qty: 180 TABLET | Status: ACTIVE | COMMUNITY
Start: 2023-08-21

## 2024-11-11 RX ORDER — METOCLOPRAMIDE 5 MG/1
1 TABLET BEFORE MEALS TABLET ORAL TWICE A DAY
Qty: 180 TABLET | Refills: 1 | Status: ACTIVE | COMMUNITY

## 2024-11-11 RX ORDER — PANTOPRAZOLE SODIUM 40 MG/1
TABLET, DELAYED RELEASE ORAL
Refills: 3 | Status: ACTIVE | COMMUNITY
Start: 2019-08-14

## 2024-11-11 RX ORDER — IRBESARTAN 150 MG/1
TABLET ORAL
Refills: 0 | Status: ACTIVE | COMMUNITY
Start: 2019-08-14

## 2024-11-11 RX ORDER — FENOFIBRATE 145 MG/1
TABLET, COATED ORAL
Qty: 90 TABLET | Status: ACTIVE | COMMUNITY

## 2024-11-11 RX ORDER — ONDANSETRON HYDROCHLORIDE 4 MG/1
TABLET, FILM COATED ORAL
Refills: 0 | Status: ACTIVE | COMMUNITY
Start: 2019-08-14

## 2024-11-11 RX ORDER — SIMVASTATIN 20 MG/1
TABLET, FILM COATED ORAL ONCE A DAY
Refills: 3 | Status: ACTIVE | COMMUNITY
Start: 2014-12-11

## 2024-11-11 NOTE — HPI-TODAY'S VISIT:
12/14: Patient still having high levels of  liver enzymes and also high level of cholesterol and triglycerides, but they are trending down. Had decreased in her weight 7 pounds (3.18 kg) in last 3 weeks. Abdomen US fatty liver. Patient states she is feeling better but is not taking her Reglan nor the Florastor, She agrees continue with her treatment, will fu her in 3 months.  4/15:  Patient continue with weight loss 2 lbs since last visit, AST 74 increase from 65, ALT: is 94 from 100, normal bili 0.4 and AP is normal 69.   TG: from 156 to 112. And cholesterol is 198.  Will continue Zocor for now, will follow in 3 months. Will do labs and ultrasound.  1/19;  Patient comes with severe GERD ad lower abdominal pain. Patient comes for surveillance colonoscopy and irritation of hemorrhoids and episode of black Stool melena, does have episode of coffee ground  reflux 2 weeks ago. Will start Reglan for her gastroparesis, and will continue PPI, EGD and colonoscopy. Patient takes NSAID will avoid NSAID.  8/19: Patient had a colonoscopy on February 1, 2019, with the finding of a 5 mm polyp in the sigmoid colon that was removed with cold biopsy forceps, internal hemorrhoids otherwise normal colon. Pathology showed hyperplastic poly, will repeat colonoscopy in 5 years. EGD showed small hiatal hernia and gastritis negative for H Pylori. Distal esophagus with inflammation, negative for Intestinal metaplasia or for fungal microorganism. Patient persist with symptoms pf gastroparesis, resolved  with Reglan will restart treatment patient denies any side effect and will start Reglan. Patient LFT persist elevated but had decrease AST: 47 and ALT: 83 rest of the labs Bili, and AP is negative. Will follow in 6 months. Discussed the need for high fiber diet and adequate hydration.  Information sheet reviewed a copy provided.   2/20 Patient here today complaining of having  left lower quadrant abdominal pain. The pain is not accompanied by nausea, vomiting, diarrhea, or fever, she is also complaining of constipation, patient has done a colonoscopy a year ago that shows evidence of internal hemorrhoids and one small benign polyp with no evidence of diverticular disease. Plan: Patient will do clear liquid diet today, will call us if symptoms get worse or do not improve, or will go to ER Department.  Patient also will have CT abdomen and pelvis. Will start on laxatives, will increase fiber and fluid intake. Continue metoclopramide 5 mg 3 times a day.  No side effects reported. 4/20 Patient is complaining of having symptoms of gastritis and also gastroparesis with abdominal distention increasing reflux, increasing gases abdominal upper abdominal pain decrease in appetite.  Patient said she is taking metoclopramide just 1 time a day in a.m.  She is not doing diet properly, she is eating large portion meal late at night. Metoclopramide side effect were discussed with her again. Plan: Patient will do diet and treatment for gastritis and gastroparesis.  She will continue Reglan 5 mg 3 times a day and also will continue with PPI once a day.  Patient will call us if symptoms do not improve. 5/20 Patient seems to be in good general state today, she did increase metoclopramide 5 mg 3 times a day and symptoms of gastroparesis are better, Patient is complaining of having some twitching on her right eyelids.  She said she never experiences this kind of muscle movement before she started metoclopramide.Patient also complaining of constipation which is chronic patient has tried OTC laxative like lactulose, bisacodyl, milk of magnesia, MiraLAX did not work for him for her, she said Motegrity is working good for her. Plan: Patient will continue with Motegrity 2 mg daily, and will alternate every other day if diarrhea.  She also will increase fiber and fluid intake and exercises. Patient will do diet for gastroparesis increasing frequency of her meals and decreasing the load of her meals. Patient was instructed to DC the use of metoclopramide due to involuntary muscle movement. Patient will do diet for gastritis and reflux and will have pantoprazole 20 mg once a day. 8/23 Patient here today in good general state.  She is complaining of symptom of dyspepsia, gastritis, reflux, and for a CT abdomen and pelvis done on May 2023 that shows evidence of fatty liver, she also claims to have elevated liver enzymes.  Unfortunately I do not have this laboratory report. Patient will have EGD, gastric emptying study, and liver work-up.  Diet for gastroparesis, continue with metoclopramide 5 mg 3 times a day. 12/23 EGD showed  Patient is evidence of small hiatal hernia, chronic gastritis, normal esophagus and examined duodenum.  Colonoscopy demonstrating three 5 mm hyperplastic polyp at the rectosigmoid colon, and internal hemorrhoids.  Bowel preparation was suboptimal next colonoscopy will be 1 year.Biopsy results duodenal mucosa without histopathologic findings.  Stomach, body/antrum biopsy with focal minimal chronic gastritis, negative for H. pylori organisms.After esophageal mucosa with mild reflux type changes, no intestinal metaplasia, or dysplasia. 7/24 Patient is here today in good general state.  She is here for her fatty liver follow-up.  Patient also complaining of constipation.  Patient has medical history of gastroparesis slow transit constipation she is not taking her metoclopramide at this time.  Patient also medical history of chronic gastritis on chronic use of PPI. Patient will do diet for gastritis and reflux.  Resume treatment with metoclopramide. Colonoscopy in 4 months. Liver workup.  11/24 Patient here today in good general state.  She has no GI complaints her symptom of gastritis and reflux are better.  Patient is due for colonoscopy we are going to plan colonoscopy.  Patient states that she has been experiencing twitching of the right eyelid, she thinks it is because her treatment with metoclopramide.  We instructed her to stop taking this medication, her daughter was present in this office visit and she understood.  Patient will do diet for gastroparesis taken small meal at the time.  Avoid foods that there more difficult for her to digest.  Laboratories shows an evidence of normal alpha-fetoprotein.  FibroScan shows evidence of severe liver steatosis with fibrosis stage II.  Patient will have treatment with first Rezdriffa 80 mg daily.

## 2024-12-12 ENCOUNTER — TELEPHONE ENCOUNTER (OUTPATIENT)
Dept: URBAN - METROPOLITAN AREA CLINIC 63 | Facility: CLINIC | Age: 54
End: 2024-12-12

## 2024-12-16 ENCOUNTER — OFFICE VISIT (OUTPATIENT)
Dept: URBAN - METROPOLITAN AREA CLINIC 60 | Facility: CLINIC | Age: 54
End: 2024-12-16

## 2024-12-16 RX ORDER — BACLOFEN 10 MG/1
TABLET ORAL
Qty: 90 TABLET | Status: ACTIVE | COMMUNITY

## 2024-12-16 RX ORDER — METOCLOPRAMIDE 5 MG/1
1 TABLET BEFORE MEALS TABLET ORAL TWICE A DAY
Qty: 180 TABLET | Status: ACTIVE | COMMUNITY
Start: 2023-08-21

## 2024-12-16 RX ORDER — GABAPENTIN 300 MG/1
CAPSULE ORAL
Refills: 3 | Status: ACTIVE | COMMUNITY
Start: 2019-08-14

## 2024-12-16 RX ORDER — METOPROLOL SUCCINATE 100 MG/1
TABLET, EXTENDED RELEASE ORAL
Refills: 3 | Status: ACTIVE | COMMUNITY
Start: 2019-08-14

## 2024-12-16 RX ORDER — SIMVASTATIN 20 MG/1
TABLET, FILM COATED ORAL ONCE A DAY
Refills: 3 | Status: ACTIVE | COMMUNITY
Start: 2014-12-11

## 2024-12-16 RX ORDER — FENOFIBRATE 145 MG/1
TABLET, COATED ORAL
Qty: 90 TABLET | Status: ACTIVE | COMMUNITY

## 2024-12-16 RX ORDER — ONDANSETRON HYDROCHLORIDE 4 MG/1
TABLET, FILM COATED ORAL
Refills: 0 | Status: ACTIVE | COMMUNITY
Start: 2019-08-14

## 2024-12-16 RX ORDER — IRBESARTAN 150 MG/1
TABLET ORAL
Refills: 0 | Status: ACTIVE | COMMUNITY
Start: 2019-08-14

## 2024-12-16 RX ORDER — PANTOPRAZOLE SODIUM 40 MG/1
TABLET, DELAYED RELEASE ORAL
Refills: 3 | Status: ACTIVE | COMMUNITY
Start: 2019-08-14

## 2024-12-16 RX ORDER — PANTOPRAZOLE SODIUM 40 MG/1
1 TABLET TABLET, DELAYED RELEASE ORAL ONCE A DAY
Qty: 90 TABLET | Refills: 0 | Status: ACTIVE | COMMUNITY
Start: 2023-08-21

## 2024-12-16 RX ORDER — RESMETIROM 80 MG/1
AS DIRECTED TABLET, COATED ORAL
Qty: 90 TABLETS | Refills: 0 | Status: ACTIVE | COMMUNITY
Start: 2024-11-11

## 2024-12-16 RX ORDER — PANTOPRAZOLE SODIUM 20 MG/1
1 TABLET TABLET, DELAYED RELEASE ORAL ONCE A DAY
Qty: 90 TABLET | Refills: 0 | Status: ACTIVE | COMMUNITY

## 2024-12-16 RX ORDER — METOCLOPRAMIDE 5 MG/1
1 TABLET BEFORE MEALS TABLET ORAL TWICE A DAY
Qty: 180 TABLET | Refills: 1 | Status: ACTIVE | COMMUNITY

## 2024-12-18 ENCOUNTER — TELEPHONE ENCOUNTER (OUTPATIENT)
Dept: URBAN - METROPOLITAN AREA CLINIC 63 | Facility: CLINIC | Age: 54
End: 2024-12-18

## 2024-12-18 ENCOUNTER — OFFICE VISIT (OUTPATIENT)
Dept: URBAN - METROPOLITAN AREA CLINIC 60 | Facility: CLINIC | Age: 54
End: 2024-12-18

## 2024-12-18 RX ORDER — METOPROLOL SUCCINATE 100 MG/1
TABLET, EXTENDED RELEASE ORAL
Refills: 3 | Status: ACTIVE | COMMUNITY
Start: 2019-08-14

## 2024-12-18 RX ORDER — SIMVASTATIN 20 MG/1
TABLET, FILM COATED ORAL ONCE A DAY
Refills: 3 | Status: ACTIVE | COMMUNITY
Start: 2014-12-11

## 2024-12-18 RX ORDER — BACLOFEN 10 MG/1
TABLET ORAL
Qty: 90 TABLET | Status: ACTIVE | COMMUNITY

## 2024-12-18 RX ORDER — PANTOPRAZOLE SODIUM 40 MG/1
TABLET, DELAYED RELEASE ORAL
Refills: 3 | Status: ACTIVE | COMMUNITY
Start: 2019-08-14

## 2024-12-18 RX ORDER — RESMETIROM 80 MG/1
AS DIRECTED TABLET, COATED ORAL
Qty: 90 TABLETS | Refills: 0
Start: 2024-11-11

## 2024-12-18 RX ORDER — PANTOPRAZOLE SODIUM 20 MG/1
1 TABLET TABLET, DELAYED RELEASE ORAL ONCE A DAY
Qty: 90 TABLET | Refills: 0 | Status: ACTIVE | COMMUNITY

## 2024-12-18 RX ORDER — PANTOPRAZOLE SODIUM 40 MG/1
1 TABLET TABLET, DELAYED RELEASE ORAL ONCE A DAY
Qty: 90 TABLET | Refills: 0 | Status: ACTIVE | COMMUNITY
Start: 2023-08-21

## 2024-12-18 RX ORDER — IRBESARTAN 150 MG/1
TABLET ORAL
Refills: 0 | Status: ACTIVE | COMMUNITY
Start: 2019-08-14

## 2024-12-18 RX ORDER — METOCLOPRAMIDE 5 MG/1
1 TABLET BEFORE MEALS TABLET ORAL TWICE A DAY
Qty: 180 TABLET | Status: ACTIVE | COMMUNITY
Start: 2023-08-21

## 2024-12-18 RX ORDER — ONDANSETRON HYDROCHLORIDE 4 MG/1
TABLET, FILM COATED ORAL
Refills: 0 | Status: ACTIVE | COMMUNITY
Start: 2019-08-14

## 2024-12-18 RX ORDER — RESMETIROM 80 MG/1
AS DIRECTED TABLET, COATED ORAL
Qty: 90 TABLETS | Refills: 0 | Status: ACTIVE | COMMUNITY
Start: 2024-11-11

## 2024-12-18 RX ORDER — GABAPENTIN 300 MG/1
CAPSULE ORAL
Refills: 3 | Status: ACTIVE | COMMUNITY
Start: 2019-08-14

## 2024-12-18 RX ORDER — FENOFIBRATE 145 MG/1
TABLET, COATED ORAL
Qty: 90 TABLET | Status: ACTIVE | COMMUNITY

## 2024-12-18 RX ORDER — METOCLOPRAMIDE 5 MG/1
1 TABLET BEFORE MEALS TABLET ORAL TWICE A DAY
Qty: 180 TABLET | Refills: 1 | Status: ACTIVE | COMMUNITY

## 2024-12-20 ENCOUNTER — TELEPHONE ENCOUNTER (OUTPATIENT)
Dept: URBAN - METROPOLITAN AREA CLINIC 63 | Facility: CLINIC | Age: 54
End: 2024-12-20

## 2024-12-20 RX ORDER — RESMETIROM 80 MG/1
AS DIRECTED TABLET, COATED ORAL
Qty: 90 TABLETS | Refills: 0
Start: 2024-11-11

## 2024-12-26 ENCOUNTER — TELEPHONE ENCOUNTER (OUTPATIENT)
Dept: URBAN - METROPOLITAN AREA CLINIC 63 | Facility: CLINIC | Age: 54
End: 2024-12-26

## 2025-04-01 ENCOUNTER — OFFICE VISIT (OUTPATIENT)
Dept: URBAN - METROPOLITAN AREA SURGERY CENTER 4 | Facility: SURGERY CENTER | Age: 55
End: 2025-04-01

## 2025-04-04 ENCOUNTER — TELEPHONE ENCOUNTER (OUTPATIENT)
Dept: URBAN - METROPOLITAN AREA CLINIC 63 | Facility: CLINIC | Age: 55
End: 2025-04-04

## 2025-04-09 ENCOUNTER — OFFICE VISIT (OUTPATIENT)
Dept: URBAN - METROPOLITAN AREA SURGERY CENTER 4 | Facility: SURGERY CENTER | Age: 55
End: 2025-04-09

## 2025-05-13 ENCOUNTER — TELEPHONE ENCOUNTER (OUTPATIENT)
Dept: URBAN - METROPOLITAN AREA CLINIC 63 | Facility: CLINIC | Age: 55
End: 2025-05-13

## 2025-05-13 RX ORDER — METOCLOPRAMIDE 5 MG/1
1 TABLET BEFORE MEALS TABLET ORAL TWICE A DAY
Qty: 180 TABLET | Refills: 1

## 2025-05-19 ENCOUNTER — TELEPHONE ENCOUNTER (OUTPATIENT)
Dept: URBAN - METROPOLITAN AREA CLINIC 63 | Facility: CLINIC | Age: 55
End: 2025-05-19

## 2025-05-19 RX ORDER — METOCLOPRAMIDE 5 MG/1
1 TABLET BEFORE MEALS TABLET ORAL TWICE A DAY
Qty: 180 TABLET | Refills: 1

## 2025-06-02 ENCOUNTER — TELEPHONE ENCOUNTER (OUTPATIENT)
Dept: URBAN - METROPOLITAN AREA CLINIC 60 | Facility: CLINIC | Age: 55
End: 2025-06-02

## 2025-06-12 ENCOUNTER — OFFICE VISIT (OUTPATIENT)
Dept: URBAN - METROPOLITAN AREA SURGERY CENTER 4 | Facility: SURGERY CENTER | Age: 55
End: 2025-06-12

## 2025-08-08 ENCOUNTER — OFFICE VISIT (OUTPATIENT)
Dept: URBAN - METROPOLITAN AREA SURGERY CENTER 4 | Facility: SURGERY CENTER | Age: 55
End: 2025-08-08

## 2025-08-27 ENCOUNTER — OFFICE VISIT (OUTPATIENT)
Dept: URBAN - METROPOLITAN AREA SURGERY CENTER 4 | Facility: SURGERY CENTER | Age: 55
End: 2025-08-27